# Patient Record
Sex: FEMALE | Race: BLACK OR AFRICAN AMERICAN | URBAN - METROPOLITAN AREA
[De-identification: names, ages, dates, MRNs, and addresses within clinical notes are randomized per-mention and may not be internally consistent; named-entity substitution may affect disease eponyms.]

---

## 2019-07-12 ENCOUNTER — APPOINTMENT (RX ONLY)
Dept: URBAN - METROPOLITAN AREA CLINIC 282 | Facility: CLINIC | Age: 64
Setting detail: DERMATOLOGY
End: 2019-07-12

## 2021-06-09 ENCOUNTER — LAB OUTSIDE AN ENCOUNTER (OUTPATIENT)
Dept: URBAN - METROPOLITAN AREA CLINIC 82 | Facility: CLINIC | Age: 66
End: 2021-06-09

## 2021-06-09 ENCOUNTER — OFFICE VISIT (OUTPATIENT)
Dept: URBAN - METROPOLITAN AREA CLINIC 82 | Facility: CLINIC | Age: 66
End: 2021-06-09
Payer: MEDICARE

## 2021-06-09 ENCOUNTER — WEB ENCOUNTER (OUTPATIENT)
Dept: URBAN - METROPOLITAN AREA CLINIC 82 | Facility: CLINIC | Age: 66
End: 2021-06-09

## 2021-06-09 VITALS
WEIGHT: 191 LBS | HEART RATE: 96 BPM | SYSTOLIC BLOOD PRESSURE: 123 MMHG | DIASTOLIC BLOOD PRESSURE: 76 MMHG | BODY MASS INDEX: 27.35 KG/M2 | TEMPERATURE: 97.1 F | HEIGHT: 70 IN

## 2021-06-09 DIAGNOSIS — Z86.010 PERSONAL HISTORY OF COLON POLYPS: ICD-10-CM

## 2021-06-09 DIAGNOSIS — Z80.0 FAMILY HISTORY OF COLON CANCER: ICD-10-CM

## 2021-06-09 PROCEDURE — 99244 OFF/OP CNSLTJ NEW/EST MOD 40: CPT | Performed by: INTERNAL MEDICINE

## 2021-06-09 PROCEDURE — G9903 PT SCRN TBCO ID AS NON USER: HCPCS | Performed by: INTERNAL MEDICINE

## 2021-06-09 PROCEDURE — 1036F TOBACCO NON-USER: CPT | Performed by: INTERNAL MEDICINE

## 2021-06-09 PROCEDURE — G8427 DOCREV CUR MEDS BY ELIG CLIN: HCPCS | Performed by: INTERNAL MEDICINE

## 2021-06-09 PROCEDURE — G8420 CALC BMI NORM PARAMETERS: HCPCS | Performed by: INTERNAL MEDICINE

## 2021-06-09 PROCEDURE — 993 AGA: Performed by: INTERNAL MEDICINE

## 2021-06-09 RX ORDER — SITAGLIPTIN 100 MG/1
1 TABLET TABLET, FILM COATED ORAL ONCE A DAY
Status: ACTIVE | COMMUNITY

## 2021-06-09 RX ORDER — GLIMEPIRIDE 4 MG/1
1 TABLET WITH BREAKFAST OR THE FIRST MAIN MEAL OF THE DAY TABLET ORAL ONCE A DAY
Status: ACTIVE | COMMUNITY

## 2021-06-09 RX ORDER — ENALAPRIL MALEATE 10 MG/1
1 TABLET TABLET ORAL ONCE A DAY
Status: ACTIVE | COMMUNITY

## 2021-06-09 RX ORDER — METFORMIN HYDROCHLORIDE 500 MG/1
1 TABLET WITH A MEAL TABLET, FILM COATED ORAL ONCE A DAY
Status: ACTIVE | COMMUNITY

## 2021-06-09 RX ORDER — CLOPIDOGREL 75 MG/1
1 TABLET TABLET, FILM COATED ORAL ONCE A DAY
Status: ACTIVE | COMMUNITY

## 2021-06-09 RX ORDER — AMLODIPINE BESYLATE 10 MG/1
1 TABLET TABLET ORAL ONCE A DAY
Status: ACTIVE | COMMUNITY

## 2021-06-09 RX ORDER — SODIUM, POTASSIUM,MAG SULFATES 17.5-3.13G
TAKE 325 ML SOLUTION, RECONSTITUTED, ORAL ORAL ONCE
Qty: 1 | Refills: 0 | OUTPATIENT
Start: 2021-06-09 | End: 2021-06-10

## 2021-06-09 RX ORDER — DULAGLUTIDE 0.75 MG/.5ML
AS DIRECTED INJECTION, SOLUTION SUBCUTANEOUS
Status: ACTIVE | COMMUNITY

## 2021-06-09 NOTE — HPI-TODAY'S VISIT:
06/09/2021 Patient is a 65 year old  female who presents on referral from Dr. Veto Bailey for colon cancer screening. A copy of the note will be sent to the referring provider. Patient had a previous colonoscopy about 5 years ago, some polyps found at the time. She denies any stomach pain, bloating, constipation. She does have intermittent acid reflux, tolerated without medication. Patient's brother passed away in 2016 from lung, colon, and liver cancer. Patient takes aspirin and Plavix as she had PAB in her legs with stents placed in her legs about 5 years ago. She does not take Eliquis anymore.

## 2021-07-26 ENCOUNTER — TELEPHONE ENCOUNTER (OUTPATIENT)
Dept: URBAN - METROPOLITAN AREA CLINIC 23 | Facility: CLINIC | Age: 66
End: 2021-07-26

## 2021-08-09 ENCOUNTER — OFFICE VISIT (OUTPATIENT)
Dept: URBAN - METROPOLITAN AREA SURGERY CENTER 13 | Facility: SURGERY CENTER | Age: 66
End: 2021-08-09

## 2022-02-08 ENCOUNTER — OFFICE VISIT (OUTPATIENT)
Dept: URBAN - METROPOLITAN AREA CLINIC 82 | Facility: CLINIC | Age: 67
End: 2022-02-08
Payer: MEDICARE

## 2022-02-08 DIAGNOSIS — Z80.0 FAMILY HISTORY OF COLON CANCER: ICD-10-CM

## 2022-02-08 DIAGNOSIS — R63.4 WEIGHT LOSS: ICD-10-CM

## 2022-02-08 DIAGNOSIS — Z86.010 PERSONAL HISTORY OF COLON POLYPS: ICD-10-CM

## 2022-02-08 PROCEDURE — G8427 DOCREV CUR MEDS BY ELIG CLIN: HCPCS | Performed by: INTERNAL MEDICINE

## 2022-02-08 PROCEDURE — G8420 CALC BMI NORM PARAMETERS: HCPCS | Performed by: INTERNAL MEDICINE

## 2022-02-08 PROCEDURE — G9903 PT SCRN TBCO ID AS NON USER: HCPCS | Performed by: INTERNAL MEDICINE

## 2022-02-08 PROCEDURE — 99214 OFFICE O/P EST MOD 30 MIN: CPT | Performed by: INTERNAL MEDICINE

## 2022-02-08 RX ORDER — GLIMEPIRIDE 4 MG/1
1 TABLET WITH BREAKFAST OR THE FIRST MAIN MEAL OF THE DAY TABLET ORAL ONCE A DAY
Status: ACTIVE | COMMUNITY

## 2022-02-08 RX ORDER — METFORMIN HYDROCHLORIDE 500 MG/1
1 TABLET WITH A MEAL TABLET, FILM COATED ORAL ONCE A DAY
Status: ACTIVE | COMMUNITY

## 2022-02-08 RX ORDER — AMLODIPINE BESYLATE 10 MG/1
1 TABLET TABLET ORAL ONCE A DAY
Status: ACTIVE | COMMUNITY

## 2022-02-08 RX ORDER — ENALAPRIL MALEATE 10 MG/1
1 TABLET TABLET ORAL ONCE A DAY
Status: ACTIVE | COMMUNITY

## 2022-02-08 RX ORDER — SITAGLIPTIN 100 MG/1
1 TABLET TABLET, FILM COATED ORAL ONCE A DAY
Status: ACTIVE | COMMUNITY

## 2022-02-08 RX ORDER — DULAGLUTIDE 0.75 MG/.5ML
AS DIRECTED INJECTION, SOLUTION SUBCUTANEOUS
Status: ACTIVE | COMMUNITY

## 2022-02-08 RX ORDER — CLOPIDOGREL 75 MG/1
1 TABLET TABLET, FILM COATED ORAL ONCE A DAY
Status: ACTIVE | COMMUNITY

## 2022-02-08 NOTE — HPI-TODAY'S VISIT:
06/09/2021 Patient is a 65 year old  female who presents on referral from Dr. Veto Bailey for colon cancer screening. A copy of the note will be sent to the referring provider. Patient had a previous colonoscopy about 5 years ago, some polyps found at the time. She denies any stomach pain, bloating, constipation. She does have intermittent acid reflux, tolerated without medication. Patient's brother passed away in 2016 from lung, colon, and liver cancer. Patient takes aspirin and Plavix as she had PAB in her legs with stents placed in her legs about 5 years ago. She does not take Eliquis anymore.   02/08/2022  Colonoscopy from 10/16/2018 showed left sided diverticulosis. Patient states her PCP Dr. Bailey recommended her to have EGD. Patient denies any bloating, abdominal pain, dysphagia, heartburn, or acid reflux. She denies any constipation or lack of appetite. She admits about 5lbs weight loss after dieting. She also admits history of elevated liver enzymes but normalized at her last PCP visit. Patient recently had cardiac stents placed on 11/10/2021. She takes Metformin, Plavix, Januvia, Trulicity, Aspirin, and hypertension medication. She is off of Eliquis. No known family history of esophageal cancers.

## 2022-03-11 ENCOUNTER — OFFICE VISIT (OUTPATIENT)
Dept: URBAN - METROPOLITAN AREA SURGERY CENTER 13 | Facility: SURGERY CENTER | Age: 67
End: 2022-03-11

## 2022-04-08 ENCOUNTER — LAB OUTSIDE AN ENCOUNTER (OUTPATIENT)
Dept: URBAN - METROPOLITAN AREA CLINIC 82 | Facility: CLINIC | Age: 67
End: 2022-04-08

## 2022-04-26 ENCOUNTER — APPOINTMENT (RX ONLY)
Dept: URBAN - METROPOLITAN AREA CLINIC 45 | Facility: CLINIC | Age: 67
Setting detail: DERMATOLOGY
End: 2022-04-26

## 2022-04-26 DIAGNOSIS — L81.1 CHLOASMA: ICD-10-CM | Status: INADEQUATELY CONTROLLED

## 2022-04-26 PROCEDURE — ? FULL BODY SKIN EXAM - DECLINED

## 2022-04-26 PROCEDURE — ? COUNSELING

## 2022-04-26 PROCEDURE — ? ADDITIONAL NOTES

## 2022-04-26 PROCEDURE — ? PRESCRIPTION MEDICATION MANAGEMENT

## 2022-04-26 PROCEDURE — 99204 OFFICE O/P NEW MOD 45 MIN: CPT

## 2022-04-26 PROCEDURE — ? MEDICATION COUNSELING

## 2022-04-26 PROCEDURE — ? PRESCRIPTION

## 2022-04-26 RX ORDER — PHARMACY COMPOUNDING ACCESSORY
EACH MISCELLANEOUS AS DIRECTED
Qty: 35 | Refills: 1 | Status: ERX | COMMUNITY
Start: 2022-04-26

## 2022-04-26 RX ADMIN — Medication: at 00:00

## 2022-04-26 ASSESSMENT — LOCATION SIMPLE DESCRIPTION DERM
LOCATION SIMPLE: INFERIOR FOREHEAD
LOCATION SIMPLE: LEFT CHEEK
LOCATION SIMPLE: RIGHT CHEEK

## 2022-04-26 ASSESSMENT — LOCATION DETAILED DESCRIPTION DERM
LOCATION DETAILED: LEFT MEDIAL MALAR CHEEK
LOCATION DETAILED: INFERIOR MID FOREHEAD
LOCATION DETAILED: RIGHT CENTRAL MALAR CHEEK

## 2022-04-26 ASSESSMENT — LOCATION ZONE DERM: LOCATION ZONE: FACE

## 2022-04-26 NOTE — PROCEDURE: PRESCRIPTION MEDICATION MANAGEMENT
Initiate Treatment: pharmacy compounding accessory As directed\\nQuantity: 35.0 g  Days Supply: 90\\nSig: Apply hydroquinone 8%/ Tretinoin 0.025% / Triamcinolone 0.025% cream to face at night x 3 months then stop x3 months
Detail Level: Zone
Render In Strict Bullet Format?: No

## 2022-04-26 NOTE — PROCEDURE: MIPS QUALITY
Quality 111:Pneumonia Vaccination Status For Older Adults: Pneumococcal Vaccination not Administered or Previously Received, Reason not Otherwise Specified
Quality 130: Documentation Of Current Medications In The Medical Record: Current Medications Documented
Quality 431: Preventive Care And Screening: Unhealthy Alcohol Use - Screening: Patient not identified as an unhealthy alcohol user when screened for unhealthy alcohol use using a systematic screening method
Quality 110: Preventive Care And Screening: Influenza Immunization: Influenza Immunization Administered during Influenza season
Quality 226: Preventive Care And Screening: Tobacco Use: Screening And Cessation Intervention: Patient screened for tobacco use and is an ex/non-smoker
Detail Level: Detailed
Quality 47: Advance Care Plan: Advance Care Planning discussed and documented; advance care plan or surrogate decision maker documented in the medical record.

## 2022-04-26 NOTE — PROCEDURE: MEDICATION COUNSELING
Xelmerrickz Pregnancy And Lactation Text: This medication is Pregnancy Category D and is not considered safe during pregnancy.  The risk during breast feeding is also uncertain.

## 2022-05-24 NOTE — PROCEDURE: MEDICATION COUNSELING
Continue to monitor and continue to use laxative.   As long but there is no diarrhea Protopic Counseling: Patient may experience a mild burning sensation during topical application. Protopic is not approved in children less than 2 years of age. There have been case reports of hematologic and skin malignancies in patients using topical calcineurin inhibitors although causality is questionable.

## 2022-06-28 ENCOUNTER — TELEPHONE ENCOUNTER (OUTPATIENT)
Dept: URBAN - METROPOLITAN AREA CLINIC 82 | Facility: CLINIC | Age: 67
End: 2022-06-28

## 2022-08-02 ENCOUNTER — OFFICE VISIT (OUTPATIENT)
Dept: URBAN - METROPOLITAN AREA MEDICAL CENTER 24 | Facility: MEDICAL CENTER | Age: 67
End: 2022-08-02

## 2022-08-24 ENCOUNTER — APPOINTMENT (RX ONLY)
Dept: URBAN - METROPOLITAN AREA CLINIC 45 | Facility: CLINIC | Age: 67
Setting detail: DERMATOLOGY
End: 2022-08-24

## 2022-08-24 DIAGNOSIS — L81.1 CHLOASMA: ICD-10-CM | Status: INADEQUATELY CONTROLLED

## 2022-08-24 PROCEDURE — ? ADDITIONAL NOTES

## 2022-08-24 PROCEDURE — ? PRESCRIPTION MEDICATION MANAGEMENT

## 2022-08-24 PROCEDURE — ? FULL BODY SKIN EXAM - DECLINED

## 2022-08-24 PROCEDURE — ? COUNSELING

## 2022-08-24 PROCEDURE — ? MEDICATION COUNSELING

## 2022-08-24 PROCEDURE — 99214 OFFICE O/P EST MOD 30 MIN: CPT

## 2022-08-24 PROCEDURE — ? PRESCRIPTION

## 2022-08-24 RX ORDER — PHARMACY COMPOUNDING ACCESSORY
EACH MISCELLANEOUS AS DIRECTED
Qty: 35 | Refills: 1 | Status: ERX | COMMUNITY
Start: 2022-08-24

## 2022-08-24 RX ORDER — TRETIONIN 0.25 MG/G
CREAM TOPICAL QHS
Qty: 45 | Refills: 1 | Status: ERX | COMMUNITY
Start: 2022-08-24

## 2022-08-24 RX ADMIN — TRETIONIN: 0.25 CREAM TOPICAL at 00:00

## 2022-08-24 RX ADMIN — Medication: at 00:00

## 2022-08-24 ASSESSMENT — LOCATION DETAILED DESCRIPTION DERM
LOCATION DETAILED: RIGHT CENTRAL MALAR CHEEK
LOCATION DETAILED: INFERIOR MID FOREHEAD
LOCATION DETAILED: LEFT MEDIAL MALAR CHEEK

## 2022-08-24 ASSESSMENT — LOCATION SIMPLE DESCRIPTION DERM
LOCATION SIMPLE: INFERIOR FOREHEAD
LOCATION SIMPLE: RIGHT CHEEK
LOCATION SIMPLE: LEFT CHEEK

## 2022-08-24 ASSESSMENT — LOCATION ZONE DERM: LOCATION ZONE: FACE

## 2022-08-24 NOTE — PROCEDURE: PRESCRIPTION MEDICATION MANAGEMENT
Initiate Treatment: pharmacy compounding accessory As directed\\nQuantity: 35.0 g  Days Supply: 90\\nSig: Apply hydroquinone 10%/ Tretinoin 0.025% / Triamcinolone 0.025% cream to face at night x 1 MONTH THE STOP and could restart without the HQ
Detail Level: Zone
Modify Regimen: WHAT TO USE FROM BREAKING FROM HQ COMPOUND\\n\\n X 2 months Cyspera + TreTinoin 0.025 QHS\\nObagi bit c serum w HQ 2 % QAM\\n\\n\\nThen after 2 months to send HQ 10%/ tret 0.025% / tac 0.025 % x 3-4 months\\n\\nNeutrogena hydroboost moisturizer
Render In Strict Bullet Format?: No
Plan: Discussed about considering chemical peels or microneedling after regimen of compound.
Continue Regimen: With current compound for 1 more month then take a break for 2 months then restart with new compound

## 2022-08-24 NOTE — PROCEDURE: MEDICATION COUNSELING
[FreeTextEntry3] : I was physically present for the key portions of the evaluation and management service provided. I agree with the history and physical, and plan which I have reviewed and edited where appropriate.\par  Minoxidil Counseling: Minoxidil is a topical medication which can increase blood flow where it is applied. It is uncertain how this medication increases hair growth. Side effects are uncommon and include stinging and allergic reactions.

## 2022-10-21 ENCOUNTER — LAB OUTSIDE AN ENCOUNTER (OUTPATIENT)
Dept: URBAN - METROPOLITAN AREA CLINIC 115 | Facility: CLINIC | Age: 67
End: 2022-10-21

## 2022-10-21 ENCOUNTER — OFFICE VISIT (OUTPATIENT)
Dept: URBAN - METROPOLITAN AREA CLINIC 115 | Facility: CLINIC | Age: 67
End: 2022-10-21
Payer: MEDICARE

## 2022-10-21 VITALS
HEIGHT: 70 IN | HEART RATE: 76 BPM | DIASTOLIC BLOOD PRESSURE: 70 MMHG | WEIGHT: 194 LBS | BODY MASS INDEX: 27.77 KG/M2 | TEMPERATURE: 96.8 F | SYSTOLIC BLOOD PRESSURE: 139 MMHG

## 2022-10-21 DIAGNOSIS — Z80.0 FAMILY HISTORY OF COLON CANCER: ICD-10-CM

## 2022-10-21 DIAGNOSIS — Z86.010 PERSONAL HISTORY OF COLON POLYPS: ICD-10-CM

## 2022-10-21 DIAGNOSIS — K21.9 GASTROESOPHAGEAL REFLUX DISEASE, UNSPECIFIED WHETHER ESOPHAGITIS PRESENT: ICD-10-CM

## 2022-10-21 PROBLEM — 312824007 FAMILY HISTORY OF CANCER OF COLON (SITUATION): Status: ACTIVE | Noted: 2021-06-09

## 2022-10-21 PROCEDURE — 99214 OFFICE O/P EST MOD 30 MIN: CPT | Performed by: INTERNAL MEDICINE

## 2022-10-21 RX ORDER — AMLODIPINE BESYLATE 10 MG/1
1 TABLET TABLET ORAL ONCE A DAY
Status: ACTIVE | COMMUNITY

## 2022-10-21 RX ORDER — CLOPIDOGREL 75 MG/1
1 TABLET TABLET, FILM COATED ORAL ONCE A DAY
Status: ACTIVE | COMMUNITY

## 2022-10-21 RX ORDER — METFORMIN HYDROCHLORIDE 500 MG/1
1 TABLET WITH A MEAL TABLET, FILM COATED ORAL ONCE A DAY
Status: ACTIVE | COMMUNITY

## 2022-10-21 RX ORDER — SITAGLIPTIN 100 MG/1
1 TABLET TABLET, FILM COATED ORAL ONCE A DAY
Status: ACTIVE | COMMUNITY

## 2022-10-21 RX ORDER — DULAGLUTIDE 0.75 MG/.5ML
AS DIRECTED INJECTION, SOLUTION SUBCUTANEOUS
Status: ACTIVE | COMMUNITY

## 2022-10-21 RX ORDER — GLIMEPIRIDE 4 MG/1
1 TABLET WITH BREAKFAST OR THE FIRST MAIN MEAL OF THE DAY TABLET ORAL ONCE A DAY
Status: ACTIVE | COMMUNITY

## 2022-10-21 RX ORDER — ENALAPRIL MALEATE 10 MG/1
1 TABLET TABLET ORAL ONCE A DAY
Status: ACTIVE | COMMUNITY

## 2022-10-21 NOTE — HPI-TODAY'S VISIT:
Patient was seen today for complaints of having flatulence abdominal bloating.  She is known to have history of acid reflux in the past she has had a colonoscopy with Dr. Nivia rincon.  She is known to have coronary artery disease with stent placement in 2020 21 November.  Patient is wondering if she could get a colonoscopy since she is overdue because of the ongoing Plavix use and after the stent placement.  Apart from the flatulence and epigastric fullness denies any other complaints of abdominal pain nausea vomiting.  She reports having acid reflux intermittently not under control occasional dysphagia but not progressive.

## 2022-10-31 ENCOUNTER — TELEPHONE ENCOUNTER (OUTPATIENT)
Dept: URBAN - METROPOLITAN AREA CLINIC 63 | Facility: CLINIC | Age: 67
End: 2022-10-31

## 2022-11-14 ENCOUNTER — TELEPHONE ENCOUNTER (OUTPATIENT)
Dept: URBAN - METROPOLITAN AREA CLINIC 115 | Facility: CLINIC | Age: 67
End: 2022-11-14

## 2022-11-14 ENCOUNTER — OFFICE VISIT (OUTPATIENT)
Dept: URBAN - METROPOLITAN AREA CLINIC 115 | Facility: CLINIC | Age: 67
End: 2022-11-14

## 2022-11-15 PROBLEM — 235595009: Status: ACTIVE | Noted: 2022-10-21

## 2022-11-15 PROBLEM — 428283002 HISTORY OF POLYP OF COLON: Status: ACTIVE | Noted: 2021-06-09

## 2022-12-06 ENCOUNTER — WEB ENCOUNTER (OUTPATIENT)
Dept: URBAN - METROPOLITAN AREA MEDICAL CENTER 48 | Facility: MEDICAL CENTER | Age: 67
End: 2022-12-06

## 2022-12-07 ENCOUNTER — OFFICE VISIT (OUTPATIENT)
Dept: URBAN - METROPOLITAN AREA CLINIC 115 | Facility: CLINIC | Age: 67
End: 2022-12-07

## 2022-12-08 ENCOUNTER — TELEPHONE ENCOUNTER (OUTPATIENT)
Dept: URBAN - METROPOLITAN AREA CLINIC 92 | Facility: CLINIC | Age: 67
End: 2022-12-08

## 2022-12-08 ENCOUNTER — OFFICE VISIT (OUTPATIENT)
Dept: URBAN - METROPOLITAN AREA MEDICAL CENTER 24 | Facility: MEDICAL CENTER | Age: 67
End: 2022-12-08
Payer: MEDICARE

## 2022-12-08 DIAGNOSIS — Z86.010 ADENOMAS PERSONAL HISTORY OF COLONIC POLYPS: ICD-10-CM

## 2022-12-08 DIAGNOSIS — D12.3 ADENOMA OF TRANSVERSE COLON: ICD-10-CM

## 2022-12-08 DIAGNOSIS — K31.89 ACQUIRED DEFORMITY OF DUODENUM: ICD-10-CM

## 2022-12-08 DIAGNOSIS — K22.70 BARRETT ESOPHAGUS: ICD-10-CM

## 2022-12-08 PROCEDURE — 45385 COLONOSCOPY W/LESION REMOVAL: CPT | Performed by: INTERNAL MEDICINE

## 2022-12-08 PROCEDURE — 43239 EGD BIOPSY SINGLE/MULTIPLE: CPT | Performed by: INTERNAL MEDICINE

## 2022-12-08 RX ORDER — PANTOPRAZOLE SODIUM 40 MG/1
1 TABLET TABLET, DELAYED RELEASE ORAL ONCE A DAY
Qty: 90 TABLET | Refills: 1 | OUTPATIENT
Start: 2022-12-08

## 2022-12-08 RX ORDER — GLIMEPIRIDE 4 MG/1
1 TABLET WITH BREAKFAST OR THE FIRST MAIN MEAL OF THE DAY TABLET ORAL ONCE A DAY
Status: ACTIVE | COMMUNITY

## 2022-12-08 RX ORDER — SITAGLIPTIN 100 MG/1
1 TABLET TABLET, FILM COATED ORAL ONCE A DAY
Status: ACTIVE | COMMUNITY

## 2022-12-08 RX ORDER — ENALAPRIL MALEATE 10 MG/1
1 TABLET TABLET ORAL ONCE A DAY
Status: ACTIVE | COMMUNITY

## 2022-12-08 RX ORDER — CLOPIDOGREL 75 MG/1
1 TABLET TABLET, FILM COATED ORAL ONCE A DAY
Status: ACTIVE | COMMUNITY

## 2022-12-08 RX ORDER — METFORMIN HYDROCHLORIDE 500 MG/1
1 TABLET WITH A MEAL TABLET, FILM COATED ORAL ONCE A DAY
Status: ACTIVE | COMMUNITY

## 2022-12-08 RX ORDER — AMLODIPINE BESYLATE 10 MG/1
1 TABLET TABLET ORAL ONCE A DAY
Status: ACTIVE | COMMUNITY

## 2022-12-08 RX ORDER — DULAGLUTIDE 0.75 MG/.5ML
AS DIRECTED INJECTION, SOLUTION SUBCUTANEOUS
Status: ACTIVE | COMMUNITY

## 2023-02-08 ENCOUNTER — RX ONLY (OUTPATIENT)
Age: 68
Setting detail: RX ONLY
End: 2023-02-08

## 2023-02-08 RX ORDER — PHARMACY COMPOUNDING ACCESSORY
EACH MISCELLANEOUS AS DIRECTED
Qty: 35 | Refills: 1 | Status: ERX

## 2023-04-14 ENCOUNTER — OFFICE VISIT (OUTPATIENT)
Dept: URBAN - METROPOLITAN AREA CLINIC 115 | Facility: CLINIC | Age: 68
End: 2023-04-14
Payer: MEDICARE

## 2023-04-14 ENCOUNTER — DASHBOARD ENCOUNTERS (OUTPATIENT)
Age: 68
End: 2023-04-14

## 2023-04-14 VITALS
TEMPERATURE: 97.5 F | SYSTOLIC BLOOD PRESSURE: 124 MMHG | HEART RATE: 78 BPM | BODY MASS INDEX: 27.35 KG/M2 | DIASTOLIC BLOOD PRESSURE: 61 MMHG | HEIGHT: 70 IN | WEIGHT: 191 LBS

## 2023-04-14 DIAGNOSIS — K21.9 GASTROESOPHAGEAL REFLUX DISEASE, UNSPECIFIED WHETHER ESOPHAGITIS PRESENT: ICD-10-CM

## 2023-04-14 DIAGNOSIS — R19.4 CHANGE IN BOWEL HABIT: ICD-10-CM

## 2023-04-14 DIAGNOSIS — Z86.010 PERSONAL HISTORY OF COLONIC POLYPS: ICD-10-CM

## 2023-04-14 DIAGNOSIS — Z80.0 FAMILY HISTORY OF COLON CANCER: ICD-10-CM

## 2023-04-14 DIAGNOSIS — K22.70 BARRETT ESOPHAGUS: ICD-10-CM

## 2023-04-14 PROCEDURE — 99214 OFFICE O/P EST MOD 30 MIN: CPT | Performed by: INTERNAL MEDICINE

## 2023-04-14 RX ORDER — CLOPIDOGREL 75 MG/1
1 TABLET TABLET, FILM COATED ORAL ONCE A DAY
Status: ACTIVE | COMMUNITY

## 2023-04-14 RX ORDER — METFORMIN HYDROCHLORIDE 500 MG/1
1 TABLET WITH A MEAL TABLET, FILM COATED ORAL ONCE A DAY
Status: ACTIVE | COMMUNITY

## 2023-04-14 RX ORDER — SITAGLIPTIN 100 MG/1
1 TABLET TABLET, FILM COATED ORAL ONCE A DAY
Status: DISCONTINUED | COMMUNITY

## 2023-04-14 RX ORDER — PANTOPRAZOLE SODIUM 40 MG/1
1 TABLET TABLET, DELAYED RELEASE ORAL ONCE A DAY
Qty: 90 TABLET | Refills: 1 | Status: ACTIVE | COMMUNITY
Start: 2022-12-08

## 2023-04-14 RX ORDER — DULAGLUTIDE 0.75 MG/.5ML
AS DIRECTED INJECTION, SOLUTION SUBCUTANEOUS
Status: ACTIVE | COMMUNITY

## 2023-04-14 RX ORDER — PANTOPRAZOLE SODIUM 20 MG/1
1 TABLET TABLET, DELAYED RELEASE ORAL ONCE A DAY
Qty: 90 TABLET | Refills: 3
Start: 2022-12-08

## 2023-04-14 RX ORDER — ENALAPRIL MALEATE 10 MG/1
1 TABLET TABLET ORAL ONCE A DAY
Status: ACTIVE | COMMUNITY

## 2023-04-14 RX ORDER — GLIMEPIRIDE 4 MG/1
1 TABLET WITH BREAKFAST OR THE FIRST MAIN MEAL OF THE DAY TABLET ORAL ONCE A DAY
Status: ACTIVE | COMMUNITY

## 2023-04-14 RX ORDER — AMLODIPINE BESYLATE 10 MG/1
1 TABLET TABLET ORAL ONCE A DAY
Status: ACTIVE | COMMUNITY

## 2023-04-14 NOTE — HPI-TODAY'S VISIT:
Graciela Neal is a 67-year-old female came into the office for the evaluation of having loose stools urgency and fecal incontinence.  Patient was seen today with her daughter over the phone.  She was recently seen for colonoscopy in December 2022 for both upper and colonoscopy which was unremarkable except for the diverticulosis and tubular adenoma.  Patient denied having any lower GI symptoms of diarrhea at that time.  Patient recently started that her symptoms are getting worse.  Inquired about all her eating habits she is on Plavix for coronary artery disease.  Patient also noticed that she may have more symptoms of abdominal bloating and loose stools after she had been on PPI for reflux esophagitis and gastritis.

## 2023-04-24 LAB
CALPROTECTIN, FECAL: 149
FECAL FAT, QUALITATIVE: NORMAL
PANCREATIC ELASTASE, FECAL: >500

## 2023-04-27 ENCOUNTER — TELEPHONE ENCOUNTER (OUTPATIENT)
Dept: URBAN - METROPOLITAN AREA CLINIC 92 | Facility: CLINIC | Age: 68
End: 2023-04-27

## 2023-04-27 RX ORDER — METRONIDAZOLE 500 MG/1
1 TABLET TABLET, FILM COATED ORAL THREE TIMES A DAY
Qty: 30 | OUTPATIENT
Start: 2023-04-28 | End: 2023-05-08

## 2023-05-06 LAB
ADENOVIRUS F 40/41: NOT DETECTED
CAMPYLOBACTER: NOT DETECTED
CLOSTRIDIUM DIFFICILE: NOT DETECTED
CRYPTOSPORIDIUM: NOT DETECTED
ENTAMOEBA HISTOLYTICA: NOT DETECTED
ENTEROAGGREGATIVE E.COLI: NOT DETECTED
ENTEROTOXIGENIC E.COLI: NOT DETECTED
ESCHERICHIA COLI O157: NOT DETECTED
GIARDIA LAMBLIA: NOT DETECTED
NOROVIRUS GI/GII: NOT DETECTED
ROTAVIRUS A: NOT DETECTED
SALMONELLA SPP.: NOT DETECTED
SHIGA-LIKE TOXIN PRODUCING E.COLI: NOT DETECTED
SHIGELLA SPP. / ENTEROINVASIVE E.COLI: NOT DETECTED
VIBRIO PARAHAEMOLYTICUS: NOT DETECTED
VIBRIO SPP.: NOT DETECTED
YERSINIA ENTEROCOLITICA: NOT DETECTED

## 2023-08-16 NOTE — PROCEDURE: MEDICATION COUNSELING
66.6 Topical Retinoid counseling:  Patient advised to apply a pea-sized amount only at bedtime and wait 30 minutes after washing their face before applying.  If too drying, patient may add a non-comedogenic moisturizer. The patient verbalized understanding of the proper use and possible adverse effects of retinoids.  All of the patient's questions and concerns were addressed.

## 2023-11-08 ENCOUNTER — OFFICE VISIT (OUTPATIENT)
Dept: URBAN - METROPOLITAN AREA CLINIC 115 | Facility: CLINIC | Age: 68
End: 2023-11-08

## 2023-11-08 RX ORDER — PANTOPRAZOLE SODIUM 20 MG/1
1 TABLET TABLET, DELAYED RELEASE ORAL ONCE A DAY
Qty: 90 TABLET | Refills: 3 | Status: ACTIVE | COMMUNITY
Start: 2022-12-08

## 2023-11-08 RX ORDER — DULAGLUTIDE 0.75 MG/.5ML
AS DIRECTED INJECTION, SOLUTION SUBCUTANEOUS
Status: ACTIVE | COMMUNITY

## 2023-11-08 RX ORDER — GLIMEPIRIDE 4 MG/1
1 TABLET WITH BREAKFAST OR THE FIRST MAIN MEAL OF THE DAY TABLET ORAL ONCE A DAY
Status: ACTIVE | COMMUNITY

## 2023-11-08 RX ORDER — ENALAPRIL MALEATE 10 MG/1
1 TABLET TABLET ORAL ONCE A DAY
Status: ACTIVE | COMMUNITY

## 2023-11-08 RX ORDER — AMLODIPINE BESYLATE 10 MG/1
1 TABLET TABLET ORAL ONCE A DAY
Status: ACTIVE | COMMUNITY

## 2023-11-08 RX ORDER — METFORMIN HYDROCHLORIDE 500 MG/1
1 TABLET WITH A MEAL TABLET, FILM COATED ORAL ONCE A DAY
Status: ACTIVE | COMMUNITY

## 2023-11-08 RX ORDER — CLOPIDOGREL 75 MG/1
1 TABLET TABLET, FILM COATED ORAL ONCE A DAY
Status: ACTIVE | COMMUNITY

## 2024-01-11 NOTE — PROCEDURE: MEDICATION COUNSELING
Detail Level: Zone General Sunscreen Counseling: I recommended a broad spectrum sunscreen with a SPF of 30 or higher.  I explained that SPF 30 sunscreens block approximately 97 percent of the sun's harmful rays.  Sunscreens should be applied at least 15 minutes prior to expected sun exposure and then every 2 hours after that as long as sun exposure continues. If swimming or exercising sunscreen should be reapplied every 45 minutes to an hour after getting wet or sweating.  One ounce, or the equivalent of a shot glass full of sunscreen, is adequate to protect the skin not covered by a bathing suit. I also recommended a lip balm with a sunscreen as well. Sun protective clothing can be used in lieu of sunscreen but must be worn the entire time you are exposed to the sun's rays. Clindamycin Pregnancy And Lactation Text: This medication can be used in pregnancy if certain situations. Clindamycin is also present in breast milk.

## 2024-02-08 NOTE — PROCEDURE: MEDICATION COUNSELING
C/o left sided back pain since last night. States pain is constant, worse with movement. Denies injury. No N/V/D, fever, or urinary symptoms. Took Ibuprofen with some relief. Also c/o numbness and tingling to right lower arm. No h/o cervical disc disease or injury. FROM observed during interview. No distress noted.    Calcipotriene Counseling:  I discussed with the patient the risks of calcipotriene including but not limited to erythema, scaling, itching, and irritation.

## 2025-05-09 NOTE — PROCEDURE: MEDICATION COUNSELING
Time-based billing (NON-critical care) Time-based billing (NON-critical care) High Dose Vitamin A Pregnancy And Lactation Text: High dose vitamin A therapy is contraindicated during pregnancy and breast feeding.